# Patient Record
Sex: FEMALE
[De-identification: names, ages, dates, MRNs, and addresses within clinical notes are randomized per-mention and may not be internally consistent; named-entity substitution may affect disease eponyms.]

---

## 2023-10-04 ENCOUNTER — NURSE TRIAGE (OUTPATIENT)
Dept: OTHER | Facility: CLINIC | Age: 63
End: 2023-10-04

## 2023-10-04 NOTE — TELEPHONE ENCOUNTER
Location of patient: [place]    Received call from [facility] at [facility] with Red Flag Complaint. Tien Richey MRN: [id number]    Provider: Dr. Webb Even: [place] states \"I have a rash\"     Current Symptoms: Saw Dr. Leslie Tolbert Friday about the rash. Rash looked to have started in her rectum and was prescribed a cream for yeast and it is not working. Rash has spread to her thighs and her back. Unable to describe what it looks like as she cannot see it. Itchy and starting to bleed with cracking skin. Associated Symptoms: reduced activity, reduced appetite, increased wakefulness    Pain Severity:  itchy; constant, severe    Temperature: denies fever     What has been tried: cream, Sitz baths with Epson Salt     Recommended disposition: See in Office Today or Tomorrow    Care advice provided, patient verbalizes understanding; denies any other questions or concerns. Outcome: Warm transferred call to [facility] at scheduling to assist with booking appt.        This triage is a result of a call to the [address]    Reason for Disposition   Rash of female genital area (e.g., labia, vagina, vulva)   MODERATE-SEVERE itching (i.e., interferes with school, work, or sleep)    Protocols used: Rash or Redness - Localized-ADULT-[place], Vulvar Symptoms-ADULT-[place]